# Patient Record
Sex: FEMALE | Race: WHITE | Employment: OTHER | URBAN - METROPOLITAN AREA
[De-identification: names, ages, dates, MRNs, and addresses within clinical notes are randomized per-mention and may not be internally consistent; named-entity substitution may affect disease eponyms.]

---

## 2017-06-26 RX ORDER — CARVEDILOL 25 MG/1
TABLET ORAL
Qty: 90 TABLET | Refills: 3 | Status: SHIPPED | OUTPATIENT
Start: 2017-06-26 | End: 2018-08-18 | Stop reason: SDUPTHER

## 2017-08-03 ENCOUNTER — OFFICE VISIT (OUTPATIENT)
Dept: FAMILY MEDICINE CLINIC | Age: 82
End: 2017-08-03
Payer: MEDICARE

## 2017-08-03 VITALS
RESPIRATION RATE: 16 BRPM | BODY MASS INDEX: 34.23 KG/M2 | HEIGHT: 66 IN | WEIGHT: 213 LBS | HEART RATE: 80 BPM | SYSTOLIC BLOOD PRESSURE: 112 MMHG | OXYGEN SATURATION: 97 % | DIASTOLIC BLOOD PRESSURE: 52 MMHG

## 2017-08-03 DIAGNOSIS — F41.9 ANXIETY: ICD-10-CM

## 2017-08-03 DIAGNOSIS — R73.01 IFG (IMPAIRED FASTING GLUCOSE): ICD-10-CM

## 2017-08-03 DIAGNOSIS — I10 ESSENTIAL HYPERTENSION: Primary | ICD-10-CM

## 2017-08-03 DIAGNOSIS — E66.9 OBESITY, UNSPECIFIED OBESITY SEVERITY, UNSPECIFIED OBESITY TYPE: ICD-10-CM

## 2017-08-03 DIAGNOSIS — M85.80 OSTEOPENIA, UNSPECIFIED LOCATION: ICD-10-CM

## 2017-08-03 DIAGNOSIS — F32.A DEPRESSION, UNSPECIFIED DEPRESSION TYPE: ICD-10-CM

## 2017-08-03 DIAGNOSIS — Z78.0 POST-MENOPAUSAL: ICD-10-CM

## 2017-08-03 DIAGNOSIS — Z23 NEED FOR PROPHYLACTIC VACCINATION AGAINST STREPTOCOCCUS PNEUMONIAE (PNEUMOCOCCUS): ICD-10-CM

## 2017-08-03 DIAGNOSIS — E03.9 ACQUIRED HYPOTHYROIDISM: ICD-10-CM

## 2017-08-03 PROCEDURE — G8400 PT W/DXA NO RESULTS DOC: HCPCS | Performed by: FAMILY MEDICINE

## 2017-08-03 PROCEDURE — 4040F PNEUMOC VAC/ADMIN/RCVD: CPT | Performed by: FAMILY MEDICINE

## 2017-08-03 PROCEDURE — 1123F ACP DISCUSS/DSCN MKR DOCD: CPT | Performed by: FAMILY MEDICINE

## 2017-08-03 PROCEDURE — 90732 PPSV23 VACC 2 YRS+ SUBQ/IM: CPT | Performed by: FAMILY MEDICINE

## 2017-08-03 PROCEDURE — G8427 DOCREV CUR MEDS BY ELIG CLIN: HCPCS | Performed by: FAMILY MEDICINE

## 2017-08-03 PROCEDURE — 1090F PRES/ABSN URINE INCON ASSESS: CPT | Performed by: FAMILY MEDICINE

## 2017-08-03 PROCEDURE — G8417 CALC BMI ABV UP PARAM F/U: HCPCS | Performed by: FAMILY MEDICINE

## 2017-08-03 PROCEDURE — 99214 OFFICE O/P EST MOD 30 MIN: CPT | Performed by: FAMILY MEDICINE

## 2017-08-03 PROCEDURE — 1036F TOBACCO NON-USER: CPT | Performed by: FAMILY MEDICINE

## 2017-08-03 PROCEDURE — G0009 ADMIN PNEUMOCOCCAL VACCINE: HCPCS | Performed by: FAMILY MEDICINE

## 2017-08-03 RX ORDER — TRIAMCINOLONE ACETONIDE 1 MG/G
CREAM TOPICAL DAILY
COMMUNITY
End: 2020-11-09

## 2017-08-03 RX ORDER — HYDROXYZINE HYDROCHLORIDE 10 MG/1
10 TABLET, FILM COATED ORAL
COMMUNITY
End: 2018-04-30 | Stop reason: ALTCHOICE

## 2017-08-03 RX ORDER — PAROXETINE HYDROCHLORIDE 40 MG/1
TABLET, FILM COATED ORAL
Qty: 90 TABLET | Refills: 3 | Status: SHIPPED | OUTPATIENT
Start: 2017-08-03 | End: 2018-04-30 | Stop reason: ALTCHOICE

## 2017-08-03 ASSESSMENT — PATIENT HEALTH QUESTIONNAIRE - PHQ9
1. LITTLE INTEREST OR PLEASURE IN DOING THINGS: 0
2. FEELING DOWN, DEPRESSED OR HOPELESS: 0
SUM OF ALL RESPONSES TO PHQ QUESTIONS 1-9: 0
SUM OF ALL RESPONSES TO PHQ9 QUESTIONS 1 & 2: 0

## 2017-08-03 ASSESSMENT — ENCOUNTER SYMPTOMS
RESPIRATORY NEGATIVE: 1
GASTROINTESTINAL NEGATIVE: 1

## 2017-08-08 ENCOUNTER — TELEPHONE (OUTPATIENT)
Dept: FAMILY MEDICINE CLINIC | Age: 82
End: 2017-08-08

## 2017-12-19 RX ORDER — LEVOTHYROXINE SODIUM 0.07 MG/1
75 TABLET ORAL DAILY
Qty: 90 TABLET | Refills: 3 | Status: SHIPPED | OUTPATIENT
Start: 2017-12-19 | End: 2020-01-08

## 2017-12-19 NOTE — TELEPHONE ENCOUNTER
Carissa Aranda called requesting a refill on the following medications:  Requested Prescriptions     Pending Prescriptions Disp Refills    levothyroxine (SYNTHROID) 75 MCG tablet 90 tablet 3     Sig: Take 1 tablet by mouth Daily     Pharmacy verified: [address]      Date of last visit: 8/3/17  Date of next visit (if applicable): Visit date not found

## 2018-04-25 ENCOUNTER — APPOINTMENT (OUTPATIENT)
Dept: GENERAL RADIOLOGY | Age: 83
End: 2018-04-25
Payer: MEDICARE

## 2018-04-25 ENCOUNTER — HOSPITAL ENCOUNTER (EMERGENCY)
Age: 83
Discharge: HOME | End: 2018-04-25
Attending: FAMILY MEDICINE
Payer: MEDICARE

## 2018-04-25 ENCOUNTER — APPOINTMENT (OUTPATIENT)
Dept: CT IMAGING | Age: 83
End: 2018-04-25
Payer: MEDICARE

## 2018-04-25 VITALS
RESPIRATION RATE: 18 BRPM | OXYGEN SATURATION: 98 % | DIASTOLIC BLOOD PRESSURE: 98 MMHG | BODY MASS INDEX: 35.49 KG/M2 | HEART RATE: 84 BPM | TEMPERATURE: 98.2 F | WEIGHT: 213 LBS | SYSTOLIC BLOOD PRESSURE: 138 MMHG | HEIGHT: 65 IN

## 2018-04-25 DIAGNOSIS — M77.8 RIGHT SHOULDER TENDINITIS: ICD-10-CM

## 2018-04-25 DIAGNOSIS — S00.03XA CONTUSION OF SCALP, INITIAL ENCOUNTER: ICD-10-CM

## 2018-04-25 DIAGNOSIS — S09.90XA INJURY OF HEAD, INITIAL ENCOUNTER: Primary | ICD-10-CM

## 2018-04-25 LAB
ALBUMIN SERPL-MCNC: 3.9 G/DL (ref 3.5–5.1)
ALP BLD-CCNC: 109 U/L (ref 38–126)
ALT SERPL-CCNC: 6 U/L (ref 11–66)
ANION GAP SERPL CALCULATED.3IONS-SCNC: 11 MEQ/L (ref 8–16)
AST SERPL-CCNC: 12 U/L (ref 5–40)
BASOPHILS # BLD: 0.3 %
BASOPHILS ABSOLUTE: 0 THOU/MM3 (ref 0–0.1)
BILIRUB SERPL-MCNC: 0.6 MG/DL (ref 0.3–1.2)
BUN BLDV-MCNC: 14 MG/DL (ref 7–22)
CALCIUM SERPL-MCNC: 9.6 MG/DL (ref 8.5–10.5)
CHLORIDE BLD-SCNC: 97 MEQ/L (ref 98–111)
CO2: 30 MEQ/L (ref 23–33)
CREAT SERPL-MCNC: 0.8 MG/DL (ref 0.4–1.2)
EKG ATRIAL RATE: 73 BPM
EKG P AXIS: 59 DEGREES
EKG P-R INTERVAL: 162 MS
EKG Q-T INTERVAL: 408 MS
EKG QRS DURATION: 90 MS
EKG QTC CALCULATION (BAZETT): 449 MS
EKG T AXIS: 49 DEGREES
EKG VENTRICULAR RATE: 73 BPM
EOSINOPHIL # BLD: 0.4 %
EOSINOPHILS ABSOLUTE: 0 THOU/MM3 (ref 0–0.4)
GFR SERPL CREATININE-BSD FRML MDRD: 69 ML/MIN/1.73M2
GLUCOSE BLD-MCNC: 100 MG/DL (ref 70–108)
HCT VFR BLD CALC: 39.4 % (ref 37–47)
HEMOGLOBIN: 13.1 GM/DL (ref 12–16)
LYMPHOCYTES # BLD: 15 %
LYMPHOCYTES ABSOLUTE: 1.3 THOU/MM3 (ref 1–4.8)
MCH RBC QN AUTO: 30.1 PG (ref 27–31)
MCHC RBC AUTO-ENTMCNC: 33.3 GM/DL (ref 33–37)
MCV RBC AUTO: 90.2 FL (ref 81–99)
MONOCYTES # BLD: 8.6 %
MONOCYTES ABSOLUTE: 0.7 THOU/MM3 (ref 0.4–1.3)
NUCLEATED RED BLOOD CELLS: 0 /100 WBC
OSMOLALITY CALCULATION: 276.2 MOSMOL/KG (ref 275–300)
PDW BLD-RTO: 13.8 % (ref 11.5–14.5)
PLATELET # BLD: 231 THOU/MM3 (ref 130–400)
PMV BLD AUTO: 9.5 FL (ref 7.4–10.4)
POTASSIUM SERPL-SCNC: 4.2 MEQ/L (ref 3.5–5.2)
PRO-BNP: 1219 PG/ML (ref 0–1800)
RBC # BLD: 4.37 MILL/MM3 (ref 4.2–5.4)
SEG NEUTROPHILS: 75.7 %
SEGMENTED NEUTROPHILS ABSOLUTE COUNT: 6.6 THOU/MM3 (ref 1.8–7.7)
SODIUM BLD-SCNC: 138 MEQ/L (ref 135–145)
TOTAL PROTEIN: 7.7 G/DL (ref 6.1–8)
TROPONIN T: < 0.01 NG/ML
WBC # BLD: 8.7 THOU/MM3 (ref 4.8–10.8)

## 2018-04-25 PROCEDURE — 72125 CT NECK SPINE W/O DYE: CPT

## 2018-04-25 PROCEDURE — 85025 COMPLETE CBC W/AUTO DIFF WBC: CPT

## 2018-04-25 PROCEDURE — 73030 X-RAY EXAM OF SHOULDER: CPT

## 2018-04-25 PROCEDURE — 70450 CT HEAD/BRAIN W/O DYE: CPT

## 2018-04-25 PROCEDURE — 80053 COMPREHEN METABOLIC PANEL: CPT

## 2018-04-25 PROCEDURE — 93005 ELECTROCARDIOGRAM TRACING: CPT | Performed by: FAMILY MEDICINE

## 2018-04-25 PROCEDURE — 84484 ASSAY OF TROPONIN QUANT: CPT

## 2018-04-25 PROCEDURE — 71045 X-RAY EXAM CHEST 1 VIEW: CPT

## 2018-04-25 PROCEDURE — 99285 EMERGENCY DEPT VISIT HI MDM: CPT

## 2018-04-25 PROCEDURE — 83880 ASSAY OF NATRIURETIC PEPTIDE: CPT

## 2018-04-25 PROCEDURE — 36415 COLL VENOUS BLD VENIPUNCTURE: CPT

## 2018-04-25 RX ORDER — ACETAMINOPHEN 325 MG/1
650 TABLET ORAL ONCE
Status: DISCONTINUED | OUTPATIENT
Start: 2018-04-25 | End: 2018-04-25 | Stop reason: HOSPADM

## 2018-04-25 ASSESSMENT — PAIN DESCRIPTION - PAIN TYPE
TYPE: ACUTE PAIN

## 2018-04-25 ASSESSMENT — ENCOUNTER SYMPTOMS
EYE PAIN: 0
DIARRHEA: 0
RHINORRHEA: 0
WHEEZING: 0
EYE DISCHARGE: 0
ABDOMINAL PAIN: 0
VOMITING: 0
SHORTNESS OF BREATH: 0
NAUSEA: 0
SORE THROAT: 0
COUGH: 0
BACK PAIN: 0

## 2018-04-25 ASSESSMENT — PAIN DESCRIPTION - ORIENTATION
ORIENTATION: RIGHT
ORIENTATION: RIGHT
ORIENTATION: RIGHT;LOWER

## 2018-04-25 ASSESSMENT — PAIN DESCRIPTION - PROGRESSION: CLINICAL_PROGRESSION: GRADUALLY IMPROVING

## 2018-04-25 ASSESSMENT — PAIN SCALES - GENERAL
PAINLEVEL_OUTOF10: 5
PAINLEVEL_OUTOF10: 5
PAINLEVEL_OUTOF10: 3
PAINLEVEL_OUTOF10: 5

## 2018-04-25 ASSESSMENT — PAIN DESCRIPTION - LOCATION
LOCATION: SHOULDER
LOCATION: SHOULDER

## 2018-04-30 ENCOUNTER — OFFICE VISIT (OUTPATIENT)
Dept: FAMILY MEDICINE CLINIC | Age: 83
End: 2018-04-30
Payer: MEDICARE

## 2018-04-30 VITALS
WEIGHT: 199 LBS | OXYGEN SATURATION: 93 % | HEART RATE: 100 BPM | RESPIRATION RATE: 25 BRPM | HEIGHT: 66 IN | SYSTOLIC BLOOD PRESSURE: 120 MMHG | BODY MASS INDEX: 31.98 KG/M2 | DIASTOLIC BLOOD PRESSURE: 82 MMHG

## 2018-04-30 DIAGNOSIS — M25.511 ACUTE PAIN OF RIGHT SHOULDER: ICD-10-CM

## 2018-04-30 DIAGNOSIS — E03.9 HYPOTHYROIDISM, UNSPECIFIED TYPE: ICD-10-CM

## 2018-04-30 DIAGNOSIS — I10 ESSENTIAL HYPERTENSION: ICD-10-CM

## 2018-04-30 DIAGNOSIS — S09.90XA INJURY OF HEAD, INITIAL ENCOUNTER: Primary | ICD-10-CM

## 2018-04-30 DIAGNOSIS — F32.A DEPRESSION, UNSPECIFIED DEPRESSION TYPE: ICD-10-CM

## 2018-04-30 DIAGNOSIS — S00.03XA CONTUSION OF SCALP, INITIAL ENCOUNTER: ICD-10-CM

## 2018-04-30 PROCEDURE — G8427 DOCREV CUR MEDS BY ELIG CLIN: HCPCS | Performed by: FAMILY MEDICINE

## 2018-04-30 PROCEDURE — 4040F PNEUMOC VAC/ADMIN/RCVD: CPT | Performed by: FAMILY MEDICINE

## 2018-04-30 PROCEDURE — G8417 CALC BMI ABV UP PARAM F/U: HCPCS | Performed by: FAMILY MEDICINE

## 2018-04-30 PROCEDURE — G8400 PT W/DXA NO RESULTS DOC: HCPCS | Performed by: FAMILY MEDICINE

## 2018-04-30 PROCEDURE — 1123F ACP DISCUSS/DSCN MKR DOCD: CPT | Performed by: FAMILY MEDICINE

## 2018-04-30 PROCEDURE — 99214 OFFICE O/P EST MOD 30 MIN: CPT | Performed by: FAMILY MEDICINE

## 2018-04-30 PROCEDURE — 1090F PRES/ABSN URINE INCON ASSESS: CPT | Performed by: FAMILY MEDICINE

## 2018-04-30 PROCEDURE — 1036F TOBACCO NON-USER: CPT | Performed by: FAMILY MEDICINE

## 2018-04-30 RX ORDER — DULOXETIN HYDROCHLORIDE 60 MG/1
60 CAPSULE, DELAYED RELEASE ORAL DAILY
COMMUNITY
End: 2020-01-07

## 2018-04-30 RX ORDER — ALPRAZOLAM 0.25 MG/1
0.25 TABLET ORAL NIGHTLY PRN
COMMUNITY
End: 2020-11-09

## 2018-04-30 ASSESSMENT — ENCOUNTER SYMPTOMS
NAUSEA: 0
GASTROINTESTINAL NEGATIVE: 1
VOMITING: 0
RESPIRATORY NEGATIVE: 1

## 2018-05-09 ENCOUNTER — TELEPHONE (OUTPATIENT)
Dept: FAMILY MEDICINE CLINIC | Age: 83
End: 2018-05-09

## 2018-05-09 RX ORDER — IBUPROFEN 600 MG/1
600 TABLET ORAL EVERY 8 HOURS PRN
Qty: 30 TABLET | Refills: 1 | Status: SHIPPED | OUTPATIENT
Start: 2018-05-09 | End: 2020-01-08

## 2018-08-20 RX ORDER — CARVEDILOL 25 MG/1
TABLET ORAL
Qty: 90 TABLET | Refills: 0 | Status: SHIPPED | OUTPATIENT
Start: 2018-08-20 | End: 2020-11-09

## 2020-01-07 ENCOUNTER — OFFICE VISIT (OUTPATIENT)
Dept: FAMILY MEDICINE CLINIC | Age: 85
End: 2020-01-07
Payer: MEDICARE

## 2020-01-07 VITALS
SYSTOLIC BLOOD PRESSURE: 122 MMHG | RESPIRATION RATE: 15 BRPM | HEART RATE: 65 BPM | TEMPERATURE: 97.7 F | BODY MASS INDEX: 29.06 KG/M2 | WEIGHT: 180.8 LBS | HEIGHT: 66 IN | DIASTOLIC BLOOD PRESSURE: 72 MMHG | OXYGEN SATURATION: 96 %

## 2020-01-07 PROCEDURE — 99214 OFFICE O/P EST MOD 30 MIN: CPT | Performed by: FAMILY MEDICINE

## 2020-01-07 RX ORDER — SERTRALINE HYDROCHLORIDE 100 MG/1
TABLET, FILM COATED ORAL
COMMUNITY
Start: 2019-12-16 | End: 2020-11-09

## 2020-01-07 SDOH — ECONOMIC STABILITY: INCOME INSECURITY: HOW HARD IS IT FOR YOU TO PAY FOR THE VERY BASICS LIKE FOOD, HOUSING, MEDICAL CARE, AND HEATING?: NOT HARD AT ALL

## 2020-01-07 SDOH — ECONOMIC STABILITY: FOOD INSECURITY: WITHIN THE PAST 12 MONTHS, YOU WORRIED THAT YOUR FOOD WOULD RUN OUT BEFORE YOU GOT MONEY TO BUY MORE.: NEVER TRUE

## 2020-01-07 SDOH — ECONOMIC STABILITY: TRANSPORTATION INSECURITY
IN THE PAST 12 MONTHS, HAS THE LACK OF TRANSPORTATION KEPT YOU FROM MEDICAL APPOINTMENTS OR FROM GETTING MEDICATIONS?: NO

## 2020-01-07 SDOH — ECONOMIC STABILITY: FOOD INSECURITY: WITHIN THE PAST 12 MONTHS, THE FOOD YOU BOUGHT JUST DIDN'T LAST AND YOU DIDN'T HAVE MONEY TO GET MORE.: NEVER TRUE

## 2020-01-07 SDOH — ECONOMIC STABILITY: TRANSPORTATION INSECURITY
IN THE PAST 12 MONTHS, HAS LACK OF TRANSPORTATION KEPT YOU FROM MEETINGS, WORK, OR FROM GETTING THINGS NEEDED FOR DAILY LIVING?: NO

## 2020-01-07 ASSESSMENT — ENCOUNTER SYMPTOMS
SHORTNESS OF BREATH: 0
GASTROINTESTINAL NEGATIVE: 1
COUGH: 1
WHEEZING: 0
CHEST TIGHTNESS: 0
SINUS PRESSURE: 1

## 2020-01-07 NOTE — PROGRESS NOTES
Subjective:      Patient ID: Tiffany Warren is a 81 y.o. female. HPI:    Chief Complaint   Patient presents with    Nasal Congestion     present for 3 days not taking OTC medication for current symptoms     Cough    Chest Congestion     Pt here to re-establish. Was going to [facility]. Pt here for sinus and chest congestion for the last few days. Denies fevers. Denies wheezing or chest tightness. She is taking OTC cough and cold medication with little relief. BP looks great. BP Readings from Last 3 Encounters:   01/07/20 122/72   04/30/18 120/82   04/25/18 (!) 138/98     Down 20 lbs since last time in this office. She is trying to lose weight. Wt Readings from Last 3 Encounters:   01/07/20 180 lb 12.8 oz (82 kg)   04/30/18 199 lb (90.3 kg)   04/25/18 213 lb (96.6 kg)     Hx of anxiety, well controlled on the Zoloft and daily Xanax. No recent labs. Patient Active Problem List   Diagnosis    Hypothyroid    HTN (hypertension)    Depression    Post-menopausal     Past Surgical History:   Procedure Laterality Date    CHOLECYSTECTOMY      VASCULAR SURGERY       Prior to Admission medications    Medication Sig Start Date End Date Taking? Authorizing Provider   sertraline (ZOLOFT) 100 MG tablet TK 1 AND 1/2 TS PO QD 12/16/19  Yes Historical Provider, MD   carvedilol (COREG) 25 MG tablet TAKE 1/2 TABLET BY MOUTH TWICE DAILY 8/20/18  Yes Evie Yanez DO   ibuprofen (IBU) 600 MG tablet Take 1 tablet by mouth every 8 hours as needed for Pain Take with food. 5/9/18  Yes Evie Yanez DO   ALPRAZolam [place]) 0.25 MG tablet Take 0.25 mg by mouth nightly as needed for Sleep. Kathy Tom Historical Provider, MD   levothyroxine (SYNTHROID) 75 MCG tablet Take 1 tablet by mouth Daily 12/19/17  Yes Evie Yanez DO   triamcinolone (KENALOG) 0.1 % cream Apply topically daily Apply topically 2 times daily.    Yes Historical Provider, MD   naproxen sodium (ALEVE) 220 MG tablet Take 220 mg by mouth 2 times daily (with meals)   Yes Historical Provider, MD   aspirin 81 MG EC tablet Take 81 mg by mouth daily.      Yes Historical Provider, MD       Lab Results   Component Value Date    LABA1C 6.1 (H) 10/08/2015     No results found for: EAG    No components found for: CHLPL  Lab Results   Component Value Date    TRIG 98 10/08/2015    TRIG 61 07/31/2013    TRIG 57 06/25/2012     Lab Results   Component Value Date    HDL 76 (H) 10/08/2015    HDL 68 07/31/2013    HDL 76 06/25/2012     Lab Results   Component Value Date    LDLCALC 90 10/08/2015    LDLCALC 94 07/31/2013    LDLCALC 89 06/25/2012     Lab Results   Component Value Date    LABVLDL 20 10/08/2015         Chemistry        Component Value Date/Time     04/25/2018 1859    K 4.2 04/25/2018 1859    CL 97 (L) 04/25/2018 1859    CO2 30 04/25/2018 1859    BUN 14 04/25/2018 1859    CREATININE 0.8 04/25/2018 1859        Component Value Date/Time    CALCIUM 9.6 04/25/2018 1859    ALKPHOS 109 04/25/2018 1859    AST 12 04/25/2018 1859    ALT 6 (L) 04/25/2018 1859    BILITOT 0.6 04/25/2018 1859            Lab Results   Component Value Date    TSH 2.350 01/12/2016       Lab Results   Component Value Date    WBC 8.7 04/25/2018    HGB 13.1 04/25/2018    HCT 39.4 04/25/2018    MCV 90.2 04/25/2018     04/25/2018         Health Maintenance   Topic Date Due    Shingles Vaccine (1 of 2) 02/24/1986    TSH testing  01/12/2017    Flu vaccine (1) 09/01/2019    DTaP/Tdap/Td vaccine (2 - Td) 01/03/2026    DEXA (modify frequency per FRAX score)  Completed    Pneumococcal 65+ years Vaccine  Completed       Immunization History   Administered Date(s) Administered    Influenza 10/03/2012    Influenza Vaccine, unspecified formulation 09/10/2015    Influenza Virus Vaccine 08/20/2014, 10/13/2016    Pneumococcal Conjugate 13-valent ([id number]) 09/10/2015    Pneumococcal Polysaccharide ([id number]) 10/13/2016, 08/03/2017    Tdap (Boostrix,

## 2020-01-08 LAB
ABSOLUTE BASO #: 0.1 X10E9/L (ref 0–0.9)
ABSOLUTE EOS #: 0.2 X10E9/L (ref 0–0.4)
ABSOLUTE LYMPH #: 2.3 X10E9/L (ref 1–3.5)
ABSOLUTE MONO #: 0.7 X10E9/L (ref 0–0.9)
ABSOLUTE NEUT #: 5.6 X10E9/L (ref 1.5–6.6)
ALBUMIN SERPL-MCNC: 4.3 G/DL (ref 3.2–5.3)
ALK PHOSPHATASE: 81 U/L (ref 39–130)
ALT SERPL-CCNC: 8 U/L (ref 0–31)
ANION GAP SERPL CALCULATED.3IONS-SCNC: 15 MMOL/L (ref 4–12)
AST SERPL-CCNC: 15 U/L (ref 0–41)
AVERAGE GLUCOSE: 114 MG/DL
BASOPHILS RELATIVE PERCENT: 1.3 %
BILIRUB SERPL-MCNC: 0.4 MG/DL (ref 0.3–1.2)
BUN BLDV-MCNC: 30 MG/DL (ref 5–27)
CALCIUM SERPL-MCNC: 9.5 MG/DL (ref 8.5–10.5)
CHLORIDE BLD-SCNC: 102 MMOL/L (ref 98–109)
CHOLESTEROL/HDL RATIO: 2.6 (ref 1–5)
CHOLESTEROL: 198 MG/DL (ref 150–200)
CO2: 25 MMOL/L (ref 22–32)
CREAT SERPL-MCNC: 1.22 MG/DL (ref 0.4–1)
EGFR AFRICAN AMERICAN: 51 ML/MIN/1.73SQ.M
EGFR IF NONAFRICAN AMERICAN: 42 ML/MIN/1.73SQ.M
EOSINOPHILS RELATIVE PERCENT: 2.3 %
GLUCOSE: 75 MG/DL (ref 65–99)
HBA1C MFR BLD: 5.6 % (ref 4.4–6.4)
HCT VFR BLD CALC: 41 % (ref 34–48)
HDLC SERPL-MCNC: 75 MG/DL
HEMOGLOBIN: 13.9 G/DL (ref 11.7–16.1)
LDL CHOLESTEROL CALCULATED: 107 MG/DL
LDL/HDL RATIO: 1.4
LYMPHOCYTE %: 25.8 %
MCH RBC QN AUTO: 30.6 PG (ref 27–35)
MCHC RBC AUTO-ENTMCNC: 33.8 G/DL (ref 31–36)
MCV RBC AUTO: 91 FL (ref 81–101)
MONOCYTES # BLD: 7.9 %
NEUTROPHILS RELATIVE PERCENT: 62.7 %
PDW BLD-RTO: 14.5 % (ref 11.5–14.7)
PLATELETS: 212 X10E9/L (ref 150–450)
PMV BLD AUTO: 9.6 FL (ref 7–12)
POTASSIUM SERPL-SCNC: 4.3 MMOL/L (ref 3.5–5)
RBC: 4.54 X10E12/L (ref 3.3–5.5)
SODIUM BLD-SCNC: 142 MMOL/L (ref 134–146)
T4 FREE: 0.86 NG/DL (ref 0.61–1.6)
TOTAL PROTEIN: 7.7 G/DL (ref 6–8)
TRIGL SERPL-MCNC: 81 MG/DL (ref 27–150)
TSH SERPL DL<=0.05 MIU/L-ACNC: 5.33 UIU/ML (ref 0.49–4.67)
VLDLC SERPL CALC-MCNC: 16 MG/DL (ref 0–30)
WBC: 8.9 X10E9/L (ref 4–11.8)

## 2020-01-08 RX ORDER — LEVOTHYROXINE SODIUM 88 UG/1
88 TABLET ORAL DAILY
Qty: 90 TABLET | Refills: 0 | Status: SHIPPED | OUTPATIENT
Start: 2020-01-08 | End: 2020-11-09

## 2020-10-28 ENCOUNTER — NURSE ONLY (OUTPATIENT)
Dept: FAMILY MEDICINE CLINIC | Age: 85
End: 2020-10-28
Payer: MEDICARE

## 2020-10-28 VITALS — TEMPERATURE: 97 F

## 2020-10-28 PROCEDURE — G0008 ADMIN INFLUENZA VIRUS VAC: HCPCS | Performed by: FAMILY MEDICINE

## 2020-10-28 PROCEDURE — 90694 VACC AIIV4 NO PRSRV 0.5ML IM: CPT | Performed by: FAMILY MEDICINE

## 2020-10-28 NOTE — PROGRESS NOTES
After obtaining consent, and per orders of Dr. Misa García, injection of Fluad 0.5 ML given IM in Left deltoid by Lakeshia Araiza CMA . Patient instructed to remain in clinic for 20 minutes afterwards, and to report any adverse reaction to me immediately.     Immunizations Administered     Name Date Dose Route    Influenza, Quadv, adjuvanted, 65 yrs +, IM, PF (Fluad) 10/28/2020 0.5 mL Intramuscular    Site: Deltoid- Left    Lot: [id number]    NDC: 97049-230-78

## 2020-11-09 ENCOUNTER — OFFICE VISIT (OUTPATIENT)
Dept: FAMILY MEDICINE CLINIC | Age: 85
End: 2020-11-09
Payer: MEDICARE

## 2020-11-09 VITALS
DIASTOLIC BLOOD PRESSURE: 80 MMHG | BODY MASS INDEX: 29.72 KG/M2 | RESPIRATION RATE: 12 BRPM | HEIGHT: 66 IN | HEART RATE: 60 BPM | TEMPERATURE: 97 F | WEIGHT: 184.9 LBS | SYSTOLIC BLOOD PRESSURE: 136 MMHG

## 2020-11-09 PROCEDURE — 1123F ACP DISCUSS/DSCN MKR DOCD: CPT | Performed by: FAMILY MEDICINE

## 2020-11-09 PROCEDURE — G0438 PPPS, INITIAL VISIT: HCPCS | Performed by: FAMILY MEDICINE

## 2020-11-09 PROCEDURE — 4040F PNEUMOC VAC/ADMIN/RCVD: CPT | Performed by: FAMILY MEDICINE

## 2020-11-09 PROCEDURE — G8484 FLU IMMUNIZE NO ADMIN: HCPCS | Performed by: FAMILY MEDICINE

## 2020-11-09 ASSESSMENT — LIFESTYLE VARIABLES: HOW OFTEN DO YOU HAVE A DRINK CONTAINING ALCOHOL: 0

## 2020-11-09 ASSESSMENT — PATIENT HEALTH QUESTIONNAIRE - PHQ9
SUM OF ALL RESPONSES TO PHQ QUESTIONS 1-9: 0
2. FEELING DOWN, DEPRESSED OR HOPELESS: 0
SUM OF ALL RESPONSES TO PHQ QUESTIONS 1-9: 0
1. LITTLE INTEREST OR PLEASURE IN DOING THINGS: 0
SUM OF ALL RESPONSES TO PHQ QUESTIONS 1-9: 0
SUM OF ALL RESPONSES TO PHQ9 QUESTIONS 1 & 2: 0

## 2020-11-09 ASSESSMENT — ENCOUNTER SYMPTOMS
GASTROINTESTINAL NEGATIVE: 1
RESPIRATORY NEGATIVE: 1

## 2020-11-09 NOTE — PATIENT INSTRUCTIONS
You may receive a survey about your visit with us today. The feedback from our patients helps us identify what is working well and where the service to all patients can be enhanced. Thank you! Patient Education        High Blood Pressure: Care Instructions  Overview     It's normal for blood pressure to go up and down throughout the day. But if it stays up, you have high blood pressure. Another name for high blood pressure is hypertension. Despite what a lot of people think, high blood pressure usually doesn't cause headaches or make you feel dizzy or lightheaded. It usually has no symptoms. But it does increase your risk of stroke, heart attack, and other problems. You and your doctor will talk about your risks of these problems based on your blood pressure. Your doctor will give you a goal for your blood pressure. Your goal will be based on your health and your age. Lifestyle changes, such as eating healthy and being active, are always important to help lower blood pressure. You might also take medicine to reach your blood pressure goal.  Follow-up care is a key part of your treatment and safety. Be sure to make and go to all appointments, and call your doctor if you are having problems. It's also a good idea to know your test results and keep a list of the medicines you take. How can you care for yourself at home? Medical treatment  · If you stop taking your medicine, your blood pressure will go back up. You may take one or more types of medicine to lower your blood pressure. Be safe with medicines. Take your medicine exactly as prescribed. Call your doctor if you think you are having a problem with your medicine. · Talk to your doctor before you start taking aspirin every day. Aspirin can help certain people lower their risk of a heart attack or stroke. But taking aspirin isn't right for everyone, because it can cause serious bleeding. · See your doctor regularly.  You may need to see the doctor more often at first or until your blood pressure comes down. · If you are taking blood pressure medicine, talk to your doctor before you take decongestants or anti-inflammatory medicine, such as ibuprofen. Some of these medicines can raise blood pressure. · Learn how to check your blood pressure at home. Lifestyle changes  · Stay at a healthy weight. This is especially important if you put on weight around the waist. Losing even 10 pounds can help you lower your blood pressure. · If your doctor recommends it, get more exercise. Walking is a good choice. Bit by bit, increase the amount you walk every day. Try for at least 30 minutes on most days of the week. You also may want to swim, bike, or do other activities. · Avoid or limit alcohol. Talk to your doctor about whether you can drink any alcohol. · Try to limit how much sodium you eat to less than 2,300 milligrams (mg) a day. Your doctor may ask you to try to eat less than 1,500 mg a day. · Eat plenty of fruits (such as bananas and oranges), vegetables, legumes, whole grains, and low-fat dairy products. · Lower the amount of saturated fat in your diet. Saturated fat is found in animal products such as milk, cheese, and meat. Limiting these foods may help you lose weight and also lower your risk for heart disease. · Do not smoke. Smoking increases your risk for heart attack and stroke. If you need help quitting, talk to your doctor about stop-smoking programs and medicines. These can increase your chances of quitting for good. When should you call for help? Call  911 anytime you think you may need emergency care. This may mean having symptoms that suggest that your blood pressure is causing a serious heart or blood vessel problem. Your blood pressure may be over 180/120. For example, call 911 if:    · You have symptoms of a heart attack. These may include:  ? Chest pain or pressure, or a strange feeling in the chest.  ? Sweating. ?  [employer] disclaims any warranty or liability for your use of this information. Personalized Preventive Plan for Maurice Miller - 11/9/2020  Medicare offers a range of preventive health benefits. Some of the tests and screenings are paid in full while other may be subject to a deductible, co-insurance, and/or copay. Some of these benefits include a comprehensive review of your medical history including lifestyle, illnesses that may run in your family, and various assessments and screenings as appropriate. After reviewing your medical record and screening and assessments performed today your provider may have ordered immunizations, labs, imaging, and/or referrals for you. A list of these orders (if applicable) as well as your Preventive Care list are included within your After Visit Summary for your review. Other Preventive Recommendations:    · A preventive eye exam performed by an eye specialist is recommended every 1-2 years to screen for glaucoma; cataracts, macular degeneration, and other eye disorders. · A preventive dental visit is recommended every 6 months. · Try to get at least 150 minutes of exercise per week or 10,000 steps per day on a pedometer . · Order or download the FREE \"Exercise & Physical Activity: Your Everyday Guide\" from [employer]. Call 5-899.372.6698 or search [employer] online. · You need 4047-0929 mg of calcium and 5697-7341 IU of vitamin D per day. It is possible to meet your calcium requirement with diet alone, but a vitamin D supplement is usually necessary to meet this goal.  · When exposed to the sun, use a sunscreen that protects against both UVA and UVB radiation with an SPF of 30 or greater. Reapply every 2 to 3 hours or after sweating, drying off with a towel, or swimming. · Always wear a seat belt when traveling in a car. Always wear a helmet when riding a bicycle or motorcycle.

## 2020-11-09 NOTE — PROGRESS NOTES
Chronic Disease Visit Information    BP Readings from Last 3 Encounters:   11/09/20 136/80   01/07/20 122/72   04/30/18 120/82          Hemoglobin A1C (%)   Date Value   01/07/2020 5.6   10/08/2015 6.1 (H)     LDL Calculated (mg/dL)   Date Value   01/07/2020 107     HDL (mg/dL)   Date Value   01/07/2020 75     BUN (mg/dL)   Date Value   01/07/2020 30 (H)     CREATININE (mg/dL)   Date Value   01/07/2020 1.22 (H)     Glucose (mg/dL)   Date Value   01/07/2020 75            Have you changed or started any medications since your last visit including any over-the-counter medicines, vitamins, or herbal medicines? no   Are you having any side effects from any of your medications? -  no  Have you stopped taking any of your medications? Is so, why? -  yes - stopped all meds    Have you seen any other physician or provider since your last visit? No  Have you had any other diagnostic tests since your last visit? No  Have you been seen in the emergency room and/or had an admission to a hospital since we last saw you? No  Have you had your annual diabetic retinal (eye) exam? No  Have you had your routine dental cleaning in the past 6 months? no    Have you activated your [employer] account? If not, what are your barriers?  Yes     Patient Care Team:  Asif Ghotra DO as PCP - General (Family Medicine)  Asif Ghotra DO as PCP - [facility] Provider         Medical History Review  Past Medical, Family, and Social History reviewed and does contribute to the patient presenting condition    Health Maintenance   Topic Date Due    Shingles Vaccine (1 of 2) 02/24/1986    Annual Wellness Visit (AWV)  01/07/2020    TSH testing  01/07/2021    DTaP/Tdap/Td vaccine (2 - Td) 01/03/2026    DEXA (modify frequency per FRAX score)  Completed    Flu vaccine  Completed    Pneumococcal 65+ years Vaccine  Completed    Hepatitis A vaccine  Aged Out    Hepatitis B vaccine  Aged Out    Hib vaccine  Aged C/ Wilber Charo 19 Meningococcal (ACWY) vaccine  Aged Out

## 2020-11-09 NOTE — PROGRESS NOTES
Subjective:      Patient ID: Bebeto Savage is a 81 y.o. female. HPI:    Chief Complaint   Patient presents with    Medicare AW    Hypertension     AMW. BP well controlled. BP Readings from Last 3 Encounters:   11/09/20 136/80   01/07/20 122/72   04/30/18 120/82     Weight stable. Wt Readings from Last 3 Encounters:   11/09/20 184 lb 14.4 oz (83.9 kg)   01/07/20 180 lb 12.8 oz (82 kg)   04/30/18 199 lb (90.3 kg)     Pt stopped all of her medications a few months ago, just didn't want to take them anymore. No concerns for me today.     Patient Active Problem List   Diagnosis    Hypothyroid    HTN (hypertension)    Depression    Post-menopausal     Past Surgical History:   Procedure Laterality Date    CHOLECYSTECTOMY      VASCULAR SURGERY       Prior to Admission medications    Not on File       Lab Results   Component Value Date    LABA1C 5.6 01/07/2020     No results found for: EAG    No components found for: CHLPL  Lab Results   Component Value Date    TRIG 81 01/07/2020    TRIG 98 10/08/2015    TRIG 61 07/31/2013     Lab Results   Component Value Date    HDL 75 01/07/2020    HDL 76 (H) 10/08/2015    HDL 68 07/31/2013     Lab Results   Component Value Date    LDLCALC 107 01/07/2020    LDLCALC 90 10/08/2015    LDLCALC 94 07/31/2013     Lab Results   Component Value Date    LABVLDL 16 01/07/2020    LABVLDL 20 10/08/2015         Chemistry        Component Value Date/Time     01/07/2020 1025    K 4.3 01/07/2020 1025     01/07/2020 1025    CO2 25 01/07/2020 1025    BUN 30 (H) 01/07/2020 1025    CREATININE 1.22 (H) 01/07/2020 1025        Component Value Date/Time    CALCIUM 9.5 01/07/2020 1025    ALKPHOS 81 01/07/2020 1025    ALKPHOS 109 04/25/2018 1859    AST 15 01/07/2020 1025    ALT 8 01/07/2020 1025    BILITOT 0.4 01/07/2020 1025            Lab Results   Component Value Date    TSH 5.33 (H) 01/07/2020       Lab Results   Component Value Date    WBC 8.9 01/07/2020    HGB 13.9 01/07/2020    HCT 41.0 01/07/2020    MCV 91 01/07/2020     01/07/2020         Health Maintenance   Topic Date Due    Shingles Vaccine (1 of 2) 02/24/1986    Annual Wellness Visit (AWV)  01/07/2020    TSH testing  01/07/2021    DTaP/Tdap/Td vaccine (2 - Td) 01/03/2026    DEXA (modify frequency per FRAX score)  Completed    Flu vaccine  Completed    Pneumococcal 65+ years Vaccine  Completed    Hepatitis A vaccine  Aged Out    Hepatitis B vaccine  Aged Out    Hib vaccine  Aged Out    Meningococcal (ACWY) vaccine  Aged [employer] History   Administered Date(s) Administered    Influenza 10/03/2012    Influenza Vaccine, unspecified formulation 09/10/2015    Influenza Virus Vaccine 08/20/2014, 10/13/2016    Influenza, Quadv, adjuvanted, 65 yrs +, IM, PF (Fluad) 10/28/2020    Pneumococcal Conjugate 13-valent ([id number]) 09/10/2013, 09/10/2015    Pneumococcal Polysaccharide ([id number]) 10/13/2016, 08/03/2017    Tdap (Boostrix, Adacel) 01/03/2016         Review of Systems   Constitutional: Negative. HENT: Negative. Respiratory: Negative. Cardiovascular: Negative. Gastrointestinal: Negative. Musculoskeletal: Negative. All other systems reviewed and are negative. Objective:   Physical Exam  Vitals signs and nursing note reviewed. Constitutional:       General: She is not in acute distress. Appearance: Normal appearance. She is well-developed. HENT:      Head: Normocephalic and atraumatic. Right Ear: Tympanic membrane normal.      Left Ear: Tympanic membrane normal.   Eyes:      Conjunctiva/sclera: Conjunctivae normal.   Neck:      Musculoskeletal: Neck supple. Cardiovascular:      Rate and Rhythm: Normal rate and regular rhythm. Heart sounds: Normal heart sounds. No murmur. Pulmonary:      Effort: Pulmonary effort is normal.      Breath sounds: Normal breath sounds. No wheezing, rhonchi or rales.    Abdominal:      General: There is no distension. Skin:     General: Skin is warm and dry. Findings: No rash (on exposed surfaces). Neurological:      General: No focal deficit present. Mental Status: She is alert. Psychiatric:         Attention and Perception: Attention normal.         Mood and Affect: Mood normal.         Speech: Speech normal.         Behavior: Behavior normal. Behavior is cooperative. Thought Content: Thought content normal.         Judgment: Judgment normal.         Assessment:       Diagnosis Orders   1. Routine general medical examination at a health care facility     2. Essential hypertension     3. Hypothyroidism, unspecified type  TSH with Reflex   4. IFG (impaired fasting glucose)     5. GIULIANA (acute kidney injury) ([facility] [address]  Basic Metabolic Panel   6. Anxiety     7. Post-menopausal             Plan:      -  Chronic medical problems stable  -  Pt is not currently taking any meds, stopped on her own a few months ago  -  Check labs above, will call  -  RTO annually for now        Luiz Britt DO  Medicare Annual Wellness Visit  Name: Salena Vargas Date: 2020   MRN: [id number] Sex: Female   Age: 81 y.o. Ethnicity: Non-/Non    : 1935 Race: Holger Izaiah Yohan Mei is here for Medicare AWV and Hypertension    Screenings for behavioral, psychosocial and functional/safety risks, and cognitive dysfunction are all negative except as indicated below. These results, as well as other patient data from the [address] form, are documented in Flowsheets linked to this Encounter.     No Known Allergies      Prior to Visit Medications    Not on File         Past Medical History:   Diagnosis Date    Anxiety     CHF (congestive heart failure) ([facility] [address]     Thyroid condition        Past Surgical History:   Procedure Laterality Date    CHOLECYSTECTOMY      VASCULAR SURGERY           Family History   Problem Relation Age of Onset    Cancer Mother     Hearing Loss Father     Heart Attack Father     Cancer Sister     Cancer Maternal Grandfather        CareTeam (Including outside providers/suppliers regularly involved in providing care):   Patient Care Team:  Elieser Soto DO as PCP - General (Family Medicine)  Elieser Soto DO as PCP - [facility] Provider    Wt Readings from Last 3 Encounters:   11/09/20 184 lb 14.4 oz (83.9 kg)   01/07/20 180 lb 12.8 oz (82 kg)   04/30/18 199 lb (90.3 kg)     Vitals:    11/09/20 1120   BP: 136/80   Site: Left Upper Arm   Position: Sitting   Cuff Size: Medium Adult   Pulse: 60   Resp: 12   Temp: 97 °F (36.1 °C)   TempSrc: Temporal   Weight: 184 lb 14.4 oz (83.9 kg)   Height: 5' 5.75\" (1.67 m)     Body mass index is 30.07 kg/m². Based upon direct observation of the patient, evaluation of cognition reveals recent and remote memory intact. Patient's complete Health Risk Assessment and screening values have been reviewed and are found in Flowsheets. The following problems were reviewed today and where indicated follow up appointments were made and/or referrals ordered. Positive Risk Factor Screenings with Interventions:       General Health and ACP:  General  In general, how would you say your health is?: Very Good  In the past 7 days, have you experienced any of the following?  New or Increased Pain, New or Increased Fatigue, Loneliness, Social Isolation, Stress or Anger?: None of These  Do you get the social and emotional support that you need?: Yes  Do you have a Living Will?: Yes  Advance Directives     Power of  Living Will ACP-Advance Directive ACP-Power of     Not on File Not on File [address] Interventions:  · N/A    Health Habits/Nutrition:  Health Habits/Nutrition  Do you exercise for at least 20 minutes 2-3 times per week?: Yes  Have you lost any weight without trying in the past 3 months?: No  Do you eat fewer than 2 meals per day?: No  Have you seen a dentist within the past year?: Yes  Body mass index: (!) 30.07  Health Habits/Nutrition Interventions:  · Inadequate physical activity:  patient is not ready to increase his/her physical activity level at this time    Hearing/Vision:  No exam data present  Hearing/Vision  Do you or your family notice any trouble with your hearing?: No  Do you have difficulty driving, watching TV, or doing any of your daily activities because of your eyesight?: No  Have you had an eye exam within the past year?: (!) No(has appt tomorrow)  Hearing/Vision Interventions:  · Vision concerns:  patient encouraged to make appointment with his/her eye specialist    Personalized Preventive Plan   Current Health Maintenance Status  Immunization History   Administered Date(s) Administered    Influenza 10/03/2012    Influenza Vaccine, unspecified formulation 09/10/2015    Influenza Virus Vaccine 08/20/2014, 10/13/2016    Influenza, Quadv, adjuvanted, 65 yrs +, IM, PF (Fluad) 10/28/2020    Pneumococcal Conjugate 13-valent (Trent Na) 09/10/2013, 09/10/2015    Pneumococcal Polysaccharide ([id number]) 10/13/2016, 08/03/2017    Tdap (Boostrix, Adacel) 01/03/2016        Health Maintenance   Topic Date Due    Shingles Vaccine (1 of 2) 02/24/1986    Annual Wellness Visit (AWV)  01/07/2020    TSH testing  01/07/2021    DTaP/Tdap/Td vaccine (2 - Td) 01/03/2026    DEXA (modify frequency per FRAX score)  Completed    Flu vaccine  Completed    Pneumococcal 65+ years Vaccine  Completed    Hepatitis A vaccine  Aged Out    Hepatitis B vaccine  Aged Out    Hib vaccine  Aged Out    Meningococcal (ACWY) vaccine  Aged Out     Recommendations for [employer] Due: see orders and patient instructions/AVS.  . Recommended screening schedule for the next 5-10 years is provided to the patient in written form: see Patient Instructions/AVS.    Prema Garvey was seen today for medicare awv and hypertension.     Diagnoses and all orders for this visit:    Routine general medical examination at a health care facility    Essential hypertension    Hypothyroidism, unspecified type  -     TSH with Reflex; Future    IFG (impaired fasting glucose)    GIULIANA (acute kidney injury) ([facility] [address]  -     Basic Metabolic Panel;  Future    Anxiety    Post-menopausal

## 2020-11-10 LAB
ANION GAP SERPL CALCULATED.3IONS-SCNC: 10 MMOL/L (ref 5–15)
BUN BLDV-MCNC: 18 MG/DL (ref 5–27)
CALCIUM SERPL-MCNC: 9.7 MG/DL (ref 8.5–10.5)
CHLORIDE BLD-SCNC: 106 MMOL/L (ref 98–109)
CO2: 28 MMOL/L (ref 22–32)
CREAT SERPL-MCNC: 1.14 MG/DL (ref 0.4–1)
EGFR AFRICAN AMERICAN: 55 ML/MIN/1.73SQ.M
EGFR IF NONAFRICAN AMERICAN: 45 ML/MIN/1.73SQ.M
GLUCOSE: 97 MG/DL (ref 65–99)
POTASSIUM SERPL-SCNC: 4.2 MMOL/L (ref 3.5–5)
SODIUM BLD-SCNC: 144 MMOL/L (ref 134–146)
TSH SERPL DL<=0.05 MIU/L-ACNC: 3.29 UIU/ML (ref 0.49–4.67)

## 2021-03-22 ENCOUNTER — HOSPITAL ENCOUNTER (EMERGENCY)
Age: 86
Discharge: HOME | End: 2021-03-22
Attending: EMERGENCY MEDICINE
Payer: MEDICARE

## 2021-03-22 ENCOUNTER — APPOINTMENT (OUTPATIENT)
Dept: GENERAL RADIOLOGY | Age: 86
End: 2021-03-22
Payer: MEDICARE

## 2021-03-22 VITALS
HEIGHT: 66 IN | BODY MASS INDEX: 32.14 KG/M2 | RESPIRATION RATE: 16 BRPM | SYSTOLIC BLOOD PRESSURE: 154 MMHG | OXYGEN SATURATION: 97 % | WEIGHT: 200 LBS | DIASTOLIC BLOOD PRESSURE: 90 MMHG | TEMPERATURE: 97.9 F | HEART RATE: 99 BPM

## 2021-03-22 DIAGNOSIS — R55 SYNCOPE AND COLLAPSE: Primary | ICD-10-CM

## 2021-03-22 LAB
ALBUMIN SERPL-MCNC: 3.9 G/DL (ref 3.5–5.1)
ALP BLD-CCNC: 82 U/L (ref 38–126)
ALT SERPL-CCNC: 6 U/L (ref 11–66)
ANION GAP SERPL CALCULATED.3IONS-SCNC: 12 MEQ/L (ref 8–16)
AST SERPL-CCNC: 20 U/L (ref 5–40)
BACTERIA: ABNORMAL /HPF
BASOPHILS # BLD: 0.6 %
BASOPHILS ABSOLUTE: 0 THOU/MM3 (ref 0–0.1)
BILIRUB SERPL-MCNC: 0.4 MG/DL (ref 0.3–1.2)
BILIRUBIN DIRECT: < 0.2 MG/DL (ref 0–0.3)
BILIRUBIN URINE: NEGATIVE
BLOOD, URINE: NEGATIVE
BUN BLDV-MCNC: 17 MG/DL (ref 7–22)
CALCIUM SERPL-MCNC: 9.4 MG/DL (ref 8.5–10.5)
CASTS 2: ABNORMAL /LPF
CASTS UA: ABNORMAL /LPF
CHARACTER, URINE: CLEAR
CHLORIDE BLD-SCNC: 104 MEQ/L (ref 98–111)
CO2: 26 MEQ/L (ref 23–33)
COLOR: YELLOW
CREAT SERPL-MCNC: 1 MG/DL (ref 0.4–1.2)
CRYSTALS, UA: ABNORMAL
EOSINOPHIL # BLD: 1.3 %
EOSINOPHILS ABSOLUTE: 0.1 THOU/MM3 (ref 0–0.4)
EPITHELIAL CELLS, UA: ABNORMAL /HPF
ERYTHROCYTE [DISTWIDTH] IN BLOOD BY AUTOMATED COUNT: 13.5 % (ref 11.5–14.5)
ERYTHROCYTE [DISTWIDTH] IN BLOOD BY AUTOMATED COUNT: 48.7 FL (ref 35–45)
GFR SERPL CREATININE-BSD FRML MDRD: 53 ML/MIN/1.73M2
GLUCOSE BLD-MCNC: 120 MG/DL (ref 70–108)
GLUCOSE BLD-MCNC: 131 MG/DL (ref 70–108)
GLUCOSE URINE: NEGATIVE MG/DL
HCT VFR BLD CALC: 42.3 % (ref 37–47)
HEMOGLOBIN: 13.1 GM/DL (ref 12–16)
IMMATURE GRANS (ABS): 0.01 THOU/MM3 (ref 0–0.07)
IMMATURE GRANULOCYTES: 0.2 %
KETONES, URINE: NEGATIVE
LEUKOCYTE ESTERASE, URINE: ABNORMAL
LYMPHOCYTES # BLD: 21.6 %
LYMPHOCYTES ABSOLUTE: 1.3 THOU/MM3 (ref 1–4.8)
MCH RBC QN AUTO: 30.3 PG (ref 26–33)
MCHC RBC AUTO-ENTMCNC: 31 GM/DL (ref 32.2–35.5)
MCV RBC AUTO: 97.9 FL (ref 81–99)
MISCELLANEOUS 2: ABNORMAL
MONOCYTES # BLD: 5.8 %
MONOCYTES ABSOLUTE: 0.4 THOU/MM3 (ref 0.4–1.3)
NITRITE, URINE: NEGATIVE
NUCLEATED RED BLOOD CELLS: 0 /100 WBC
OSMOLALITY CALCULATION: 285.9 MOSMOL/KG (ref 275–300)
PH UA: 5.5 (ref 5–9)
PLATELET # BLD: 175 THOU/MM3 (ref 130–400)
PMV BLD AUTO: 12 FL (ref 9.4–12.4)
POTASSIUM SERPL-SCNC: 3.8 MEQ/L (ref 3.5–5.2)
PROTEIN UA: NEGATIVE
RBC # BLD: 4.32 MILL/MM3 (ref 4.2–5.4)
RBC URINE: ABNORMAL /HPF
RENAL EPITHELIAL, UA: ABNORMAL
SEG NEUTROPHILS: 70.5 %
SEGMENTED NEUTROPHILS ABSOLUTE COUNT: 4.4 THOU/MM3 (ref 1.8–7.7)
SODIUM BLD-SCNC: 142 MEQ/L (ref 135–145)
SPECIFIC GRAVITY, URINE: 1.01 (ref 1–1.03)
T4 FREE: 0.98 NG/DL (ref 0.93–1.76)
TOTAL PROTEIN: 7.3 G/DL (ref 6.1–8)
TROPONIN T: < 0.01 NG/ML
TSH SERPL DL<=0.05 MIU/L-ACNC: 3.6 UIU/ML (ref 0.4–4.2)
UROBILINOGEN, URINE: 0.2 EU/DL (ref 0–1)
WBC # BLD: 6.2 THOU/MM3 (ref 4.8–10.8)
WBC UA: ABNORMAL /HPF
YEAST: ABNORMAL

## 2021-03-22 PROCEDURE — 82948 REAGENT STRIP/BLOOD GLUCOSE: CPT

## 2021-03-22 PROCEDURE — 80053 COMPREHEN METABOLIC PANEL: CPT

## 2021-03-22 PROCEDURE — 84439 ASSAY OF FREE THYROXINE: CPT

## 2021-03-22 PROCEDURE — 99284 EMERGENCY DEPT VISIT MOD MDM: CPT

## 2021-03-22 PROCEDURE — 81001 URINALYSIS AUTO W/SCOPE: CPT

## 2021-03-22 PROCEDURE — 71045 X-RAY EXAM CHEST 1 VIEW: CPT

## 2021-03-22 PROCEDURE — 82248 BILIRUBIN DIRECT: CPT

## 2021-03-22 PROCEDURE — 84484 ASSAY OF TROPONIN QUANT: CPT

## 2021-03-22 PROCEDURE — 85025 COMPLETE CBC W/AUTO DIFF WBC: CPT

## 2021-03-22 PROCEDURE — 84443 ASSAY THYROID STIM HORMONE: CPT

## 2021-03-22 PROCEDURE — 36415 COLL VENOUS BLD VENIPUNCTURE: CPT

## 2021-03-22 PROCEDURE — 93005 ELECTROCARDIOGRAM TRACING: CPT | Performed by: EMERGENCY MEDICINE

## 2021-03-22 ASSESSMENT — ENCOUNTER SYMPTOMS
COUGH: 0
ABDOMINAL PAIN: 0
DIARRHEA: 0
NAUSEA: 0
CONSTIPATION: 0
EYE DISCHARGE: 0
SHORTNESS OF BREATH: 0
VOMITING: 0

## 2021-03-22 NOTE — ED NOTES
Patient presents to ED via EMS for dizziness and LOC. EMS states patient was going to her  grave site and passed out before getting out of the car. Patient states she does not remember what happened. Denies any pain. Shows no signs of distress. Skin warm and dry. Respirations even and unlabored.       Yesenia Fraga RN  03/22/21 [id number]

## 2021-03-22 NOTE — ED PROVIDER NOTES
[place] ENCOUNTER          Pt Name: Sam Casarez  MRN: [id number]  [place] 1935  Date of evaluation: 3/22/2021  Treating Resident Physician: Jules Mullen DPM  Supervising Physician: Jesenia Gurrola MD    [address] [place] [address]       Chief Complaint   Patient presents with    Dizziness    Loss of Consciousness     History obtained from the patient, son, and fried      HISTORY OF PRESENT ILLNESS    HPI  Sam Casarez is a 81 y.o. female who presents to the emergency department for evaluation of dizziness and confusion. Patient unable to give complete story. Patient relates she went to get fast food, then drove to the cemetery to spend time by her 's grave today. Some time after arriving to the [facility] site, patient called her son stating that she was dizzy and did not feet like she was able to drive back home safely. Patient's son then contacted patient's friend who was able to take patient to the ED. In the ED, patient is unable to discuss what happened at the [place]. Patient states she does not understand why she is here in the ED. She denies any current dizziness or symptoms similar to what she had at the [place]. When patient's son arrived to the ED, he claims that this is baseline level of mental status for his mother. The patient has no other acute complaints at this time. REVIEW OF SYSTEMS   Review of Systems   Constitutional: Negative for chills and fever. HENT: Negative for drooling and sneezing. Eyes: Negative for discharge and visual disturbance. Respiratory: Negative for cough and shortness of breath. Cardiovascular: Negative for chest pain and palpitations. Gastrointestinal: Negative for abdominal pain, constipation, diarrhea, nausea and vomiting. Genitourinary: Negative for dysuria and urgency. Musculoskeletal: Negative for arthralgias. Skin: Negative for rash and wound.    Neurological: Negative for seizures, facial asymmetry and speech difficulty. Psychiatric/Behavioral: Positive for agitation and confusion. The patient is nervous/anxious. PAST MEDICAL AND SURGICAL HISTORY     Past Medical History:   Diagnosis Date    Anxiety     CHF (congestive heart failure) ([address]     Thyroid condition      Past Surgical History:   Procedure Laterality Date    CHOLECYSTECTOMY      VASCULAR SURGERY           MEDICATIONS   No current facility-administered medications for this encounter. No current outpatient medications on file. SOCIAL HISTORY     Social History     Social History Narrative    Not on file     Social History     Tobacco Use    Smoking status: Never Smoker    Smokeless tobacco: Never Used   Substance Use Topics    Alcohol use: No    Drug use: No         ALLERGIES   No Known Allergies      FAMILY HISTORY     Family History   Problem Relation Age of Onset    Cancer Mother     Hearing Loss Father     Heart Attack Father     Cancer Sister     Cancer Maternal Grandfather          PREVIOUS RECORDS   Previous records reviewed: previous visits non-contributory. PHYSICAL EXAM     ED Triage Vitals [03/22/21 1119]   BP Temp Temp Source Pulse Resp SpO2 Height Weight   (!) 153/92 97.9 °F (36.6 °C) Oral 98 16 96 % 5' 6\" (1.676 m) 200 lb (90.7 kg)     Initial vital signs and nursing assessment reviewed and normal. Body mass index is 32.28 kg/m². Pulsoximetry is normal per my interpretation. Additional Vital Signs:  Vitals:    03/22/21 1225   BP: (!) 153/96   Pulse: 94   Resp: 16   Temp:    SpO2: 98%       Physical Exam  Constitutional:       Appearance: Normal appearance. She is obese. HENT:      Head: Normocephalic and atraumatic. Eyes:      Extraocular Movements: Extraocular movements intact. Conjunctiva/sclera: Conjunctivae normal.      Pupils: Pupils are equal, round, and reactive to light.    Cardiovascular:      Rate and Rhythm: Normal rate and regular rhythm. Pulses: Normal pulses. Heart sounds: Normal heart sounds. Pulmonary:      Effort: Pulmonary effort is normal.      Breath sounds: Normal breath sounds. No stridor. No wheezing or rales. Abdominal:      General: Abdomen is flat. Bowel sounds are normal.      Palpations: Abdomen is soft. Skin:     General: Skin is warm and dry. Neurological:      General: No focal deficit present. Mental Status: She is alert and oriented to person, place, and time. Mental status is at baseline. Cranial Nerves: No cranial nerve deficit. Comments: Patient is alert and oriented x 3   Psychiatric:      Comments: Patient is agitated and expresses confusion about why she is in the Emergency Department             MEDICAL DECISION MAKING   Initial Assessment:   1. Syncope. 2. History of CHF  Plan:    Patient seen and evaluated. Initial lab work included POCT glucose, CBC, BMP, hepatic function panel, troponin, TSH, T4, and urine analysis. Results were not significant for any acute pathology. EKG and CXR ordered, both demonstrating no acute pathology. Discussed case with patient's son. There was concern that patient was in an altered state of mind, and would not be safe to return home by herself. Patient's son stated that this is typical for his mother. Discussed with patient and her son the importance of routine follow-up with her PCP. Additionally recommended that patient follow-up with physical therapy for the dizziness that she experienced earlier today.        ED RESULTS   Laboratory results:  Labs Reviewed   CBC WITH AUTO DIFFERENTIAL - Abnormal; Notable for the following components:       Result Value    MCHC 31.0 (*)     RDW-SD 48.7 (*)     All other components within normal limits   BASIC METABOLIC PANEL - Abnormal; Notable for the following components:    Glucose 120 (*)     All other components within normal limits   HEPATIC FUNCTION PANEL - Abnormal; Notable for the following components: ALT 6 (*)     All other components within normal limits   GLOMERULAR FILTRATION RATE, ESTIMATED - Abnormal; Notable for the following components:    Est, Glom Filt Rate 53 (*)     All other components within normal limits   URINE WITH REFLEXED MICRO - Abnormal; Notable for the following components:    Leukocyte Esterase, Urine TRACE (*)     All other components within normal limits   POCT GLUCOSE - Abnormal; Notable for the following components:    POC Glucose 131 (*)     All other components within normal limits   TROPONIN   OSMOLALITY   TSH WITHOUT REFLEX   T4, FREE   ANION GAP       Radiologic studies results:  XR CHEST PORTABLE   Final Result   No interval change since previous study dated 25 April 2018, no acute cardiopulmonary disease. .               **This report has been created using voice recognition software. It may contain minor errors which are inherent in voice recognition technology. **      Final report electronically signed by DR Wendy Li on 3/22/2021 11:52 AM          ED Medications administered this visit: Medications - No data to display      ED COURSE        Strict return precautions and follow up instructions were discussed with the patient prior to discharge, with which the patient agrees. MEDICATION CHANGES     There are no discharge medications for this patient. FINAL DISPOSITION     Final diagnoses:   Syncope and collapse     Condition: condition: fair  Dispo: Discharge to home      This transcription was electronically signed. Parts of this transcriptions may have been dictated by use of voice recognition software and electronically transcribed, and parts may have been transcribed with the assistance of an ED scribe. The transcription may contain errors not detected in proofreading. Please refer to my supervising physician's documentation if my documentation differs.     Electronically Signed: Roshan Eaton, 03/22/21, 12:28 PM          Roshan Eaton DPM  Resident  03/22/21 2547 [facility]

## 2021-03-22 NOTE — ED NOTES
Patient resting quietly in cot showing no signs of distress at this time. Friend remains at bedside. Updated on POC. Call light within reach.       Estephania Cabrera RN  03/22/21 1390

## 2021-03-22 NOTE — ED NOTES
Bed: 030A  Expected date:   Expected time:   Means of arrival:   Comments:  [address] [place], RN  03/22/21 1110

## 2021-03-22 NOTE — ED PROVIDER NOTES
[facility]  EMERGENCY MEDICINE ATTENDING ATTESTATION      Evaluation of Sanket Grade. Case discussed and care plan developed with resident physician. I agree with the resident physician documentation and plan as documented by him, except if my documentation differs. Patient seen, interviewed and examined by me. I reviewed the medical, surgical, family and social history, medications and allergies. I have reviewed the nursing documentation. I have reviewed the patient's vital signs and are abnormal from hypertension per my interpretation. Body mass index is 32.28 kg/m². Pulsoxymetry is normal per my interpretation. Brief H&P   Patient c/o feeling lightheaded and possibly having a syncopal episode while visiting her 's grave. Patient states he goes every day after breakfast but today she had to sit down because she was not feeling well. She is unable to describe exactly was not feeling well but standing up on walking, even to the bathroom here elicits her symptoms. Physical exam is notable for well appearing, focal exam.  Seems slightly confused although she is able to answer orientation questions appropriately. Medical Decision Making   MDM:   1. Syncope, history of CHF  Plan:    I V line, labs   EKG   Observation. Please see the resident physician completed note for final disposition except as documented on this attestation. I have reviewed and interpreted all available lab, radiology and ekg results available at the moment. Diagnosis, treatment and disposition plans were discussed and agreed upon by patient. This transcription was electronically signed. It was dictated by use of voice recognition software and electronically transcribed. The transcription may contain errors not detected in proofreading.      I performed direct supervision and was present for the critical portion following procedures: None  Critical care time on this

## 2021-03-23 LAB
EKG ATRIAL RATE: 97 BPM
EKG ATRIAL RATE: 97 BPM
EKG P AXIS: 33 DEGREES
EKG P AXIS: 33 DEGREES
EKG P-R INTERVAL: 174 MS
EKG P-R INTERVAL: 174 MS
EKG Q-T INTERVAL: 374 MS
EKG Q-T INTERVAL: 374 MS
EKG QRS DURATION: 92 MS
EKG QRS DURATION: 92 MS
EKG QTC CALCULATION (BAZETT): 474 MS
EKG QTC CALCULATION (BAZETT): 474 MS
EKG R AXIS: 2 DEGREES
EKG R AXIS: 2 DEGREES
EKG T AXIS: 51 DEGREES
EKG T AXIS: 51 DEGREES
EKG VENTRICULAR RATE: 97 BPM
EKG VENTRICULAR RATE: 97 BPM

## 2021-03-24 ENCOUNTER — OFFICE VISIT (OUTPATIENT)
Dept: FAMILY MEDICINE CLINIC | Age: 86
End: 2021-03-24
Payer: MEDICARE

## 2021-03-24 VITALS
BODY MASS INDEX: 29.57 KG/M2 | OXYGEN SATURATION: 96 % | WEIGHT: 183.2 LBS | SYSTOLIC BLOOD PRESSURE: 130 MMHG | RESPIRATION RATE: 18 BRPM | TEMPERATURE: 97.1 F | DIASTOLIC BLOOD PRESSURE: 86 MMHG | HEART RATE: 88 BPM

## 2021-03-24 DIAGNOSIS — R41.82 ALTERED MENTAL STATUS, UNSPECIFIED ALTERED MENTAL STATUS TYPE: Primary | ICD-10-CM

## 2021-03-24 DIAGNOSIS — Z91.81 HISTORY OF RECENT FALL: ICD-10-CM

## 2021-03-24 DIAGNOSIS — R26.81 UNSTABLE GAIT: ICD-10-CM

## 2021-03-24 DIAGNOSIS — F41.9 ANXIETY: ICD-10-CM

## 2021-03-24 PROCEDURE — 4040F PNEUMOC VAC/ADMIN/RCVD: CPT | Performed by: FAMILY MEDICINE

## 2021-03-24 PROCEDURE — G8484 FLU IMMUNIZE NO ADMIN: HCPCS | Performed by: FAMILY MEDICINE

## 2021-03-24 PROCEDURE — 1123F ACP DISCUSS/DSCN MKR DOCD: CPT | Performed by: FAMILY MEDICINE

## 2021-03-24 PROCEDURE — 1036F TOBACCO NON-USER: CPT | Performed by: FAMILY MEDICINE

## 2021-03-24 PROCEDURE — 1090F PRES/ABSN URINE INCON ASSESS: CPT | Performed by: FAMILY MEDICINE

## 2021-03-24 PROCEDURE — G8400 PT W/DXA NO RESULTS DOC: HCPCS | Performed by: FAMILY MEDICINE

## 2021-03-24 PROCEDURE — G8427 DOCREV CUR MEDS BY ELIG CLIN: HCPCS | Performed by: FAMILY MEDICINE

## 2021-03-24 PROCEDURE — 99214 OFFICE O/P EST MOD 30 MIN: CPT | Performed by: FAMILY MEDICINE

## 2021-03-24 PROCEDURE — G8417 CALC BMI ABV UP PARAM F/U: HCPCS | Performed by: FAMILY MEDICINE

## 2021-03-24 RX ORDER — ASPIRIN 81 MG/1
81 TABLET ORAL DAILY
COMMUNITY

## 2021-03-24 RX ORDER — ESCITALOPRAM OXALATE 10 MG/1
10 TABLET ORAL DAILY
Qty: 30 TABLET | Refills: 1 | Status: SHIPPED | OUTPATIENT
Start: 2021-03-24

## 2021-03-24 ASSESSMENT — ENCOUNTER SYMPTOMS
RESPIRATORY NEGATIVE: 1
GASTROINTESTINAL NEGATIVE: 1

## 2021-03-24 NOTE — PROGRESS NOTES
Marisela Mendes (:  1935) is a 81 y.o. female,Established patient, here for evaluation of the following chief complaint(s):  Follow-Up from [facility] [place]. Lorena's 3/22/21 S/P fall ) and Dizziness (with weakness hard time getting up this am )        SUBJECTIVE/OBJECTIVE:  HPI:    Chief Complaint   Patient presents with   [address] from [facility]. Lorena's 3/22/21 S/P fall     Dizziness     with weakness hard time getting up this am      ED follow up for dizziness.       CHIEF COMPLAINT            Chief Complaint   Patient presents with    Dizziness    Loss of Consciousness      History obtained from the patient, son, and fried        HISTORY OF PRESENT ILLNESS    HPI  Marisela Mendes is a 81 y.o. female who presents to the emergency department for evaluation of dizziness and confusion. Patient unable to give complete story. Patient relates she went to get fast food, then drove to the cemetery to spend time by her 's grave today. Some time after arriving to the [facility] site, patient called her son stating that she was dizzy and did not feet like she was able to drive back home safely. Patient's son then contacted patient's friend who was able to take patient to the ED. In the ED, patient is unable to discuss what happened at the [place]. Patient states she does not understand why she is here in the ED. She denies any current dizziness or symptoms similar to what she had at the [place]. When patient's son arrived to the ED, he claims that this is baseline level of mental status for his mother. The patient has no other acute complaints at this time. Pt continues to feel very dizzy, having a hard time walking. The dizziness started while she was at the [address] [place] [address]. on the . Per pt, she is \"just not quite with it\". She is very anxious that she cannot stay home alone since her fall. Her balance is poor, she walks with the aid of a cane. BP looks very good.   BP Readings from Last 3 Encounters:   03/24/21 130/86   03/22/21 (!) 154/90   11/09/20 136/80         Review of Systems   Constitutional: Positive for fatigue. HENT: Negative. Respiratory: Negative. Cardiovascular: Negative. Gastrointestinal: Negative. Musculoskeletal: Positive for gait problem. Neurological: Positive for weakness. Negative for dizziness, speech difficulty, light-headedness and headaches. All other systems reviewed and are negative. Physical Exam  Vitals signs and nursing note reviewed. Constitutional:       General: She is not in acute distress. Appearance: Normal appearance. She is well-developed. HENT:      Head: Normocephalic and atraumatic. Right Ear: Tympanic membrane normal.      Left Ear: Tympanic membrane normal.   Eyes:      Extraocular Movements:      Right eye: Normal extraocular motion and no nystagmus. Left eye: Normal extraocular motion and no nystagmus. Conjunctiva/sclera: Conjunctivae normal.   Neck:      Musculoskeletal: Neck supple. Cardiovascular:      Rate and Rhythm: Normal rate and regular rhythm. Heart sounds: Normal heart sounds. No murmur. Pulmonary:      Effort: Pulmonary effort is normal.      Breath sounds: Normal breath sounds. No wheezing, rhonchi or rales. Abdominal:      General: There is no distension. Skin:     General: Skin is warm and dry. Findings: No rash (on exposed surfaces). Neurological:      General: No focal deficit present. Mental Status: She is alert. Cranial Nerves: Cranial nerves are intact. Sensory: Sensation is intact. Motor: Weakness and atrophy present. Gait: Gait abnormal (slow, unsteady gait). Psychiatric:         Attention and Perception: Attention normal.         Mood and Affect: Mood normal.         Speech: Speech normal.         Behavior: Behavior normal. Behavior is cooperative. Thought Content:  Thought content normal.         Judgment: Judgment normal.      ASSESSMENT/PLAN:  1. Altered mental status, unspecified altered mental status type  2. History of recent fall  3. Unstable gait  4. Anxiety  -     escitalopram (LEXAPRO) 10 MG tablet; Take 1 tablet by mouth daily, Disp-30 tablet, [id number]    -  Uncertain etiology of her symptoms at this time  -  Per ED note and son, pt with periodic dizziness and unsteady gait  -  Per pt, she just \"feels off\" but denies dizziness or lightheadedness  -  Gait is unsteady, walks with aid of cane but willing to use wheeled walker for which they have  -  Labs, CXR, EKG in ED ok  -  Discussed further work up:  CT, holter, echo, carotid US, etc.  Pt declines at this time  -  Start Lexapro for her anxiety    Return in about 4 weeks (around 4/21/2021) for anxiety. An electronic signature was used to authenticate this note.     --Acacia Dunham, DO

## 2021-03-24 NOTE — PROGRESS NOTES
Visit Information    Have you changed or started any medications since your last visit including any over-the-counter medicines, vitamins, or herbal medicines? no   Are you having any side effects from any of your medications? -  no  Have you stopped taking any of your medications? Is so, why? -  no    Have you seen any other physician or provider since your last visit? Yes - Records Obtained  Have you had any other diagnostic tests since your last visit? Yes - Records Obtained  Have you been seen in the emergency room and/or had an admission to a hospital since we last saw you? Yes - Records Obtained  Have you had your routine dental cleaning in the past 6 months? na    Have you activated your [employer] account? If not, what are your barriers?  Yes     Patient Care Team:  Trenton Graves DO as PCP - General (Family Medicine)  Trenton Graves DO as PCP - [facility]    Medical History Review  Past Medical, Family, and Social History reviewed and does not contribute to the patient presenting condition    Health Maintenance   Topic Date Due    Shingles Vaccine (1 of 2) Never done    COVID-19 Vaccine (2 - Moderna 2-dose series) 03/25/2021    Annual Wellness Visit (AWV)  11/10/2021    TSH testing  03/22/2022    DTaP/Tdap/Td vaccine (2 - Td) 01/03/2026    DEXA (modify frequency per FRAX score)  Completed    Flu vaccine  Completed    Pneumococcal 65+ years Vaccine  Completed    Hepatitis A vaccine  Aged Out    Hepatitis B vaccine  Aged Out    Hib vaccine  Aged Out    Meningococcal (ACWY) vaccine  Aged Out

## 2021-05-03 ENCOUNTER — TELEPHONE (OUTPATIENT)
Dept: FAMILY MEDICINE CLINIC | Age: 86
End: 2021-05-03

## 2021-05-03 NOTE — TELEPHONE ENCOUNTER
----- Message from Akash Joy sent at 4/30/2021  5:22 PM EDT -----  Subject: Message to Provider    QUESTIONS  Information for Provider? Patient called in because she stated she   realized she missed her 4 week f/u 47/26/21 she stated she feels fine ,   does she need to make another appt. ... ---------------------------------------------------------------------------  --------------  [employer] ANNALISA  What is the best way for the office to contact you? OK to leave message on   voicemail  Preferred Call Back Phone Number? [phone]  ---------------------------------------------------------------------------  --------------  SCRIPT ANSWERS  Relationship to Patient?  Self

## 2025-05-07 ENCOUNTER — TELEPHONE (OUTPATIENT)
Dept: FAMILY MEDICINE CLINIC | Age: 89
End: 2025-05-07

## 2025-05-07 NOTE — TELEPHONE ENCOUNTER
Received a call from Kim at [facility]. She stated that while in rehab, Dr. Agarwal had written a 30 day prescription for Doxepin 10 mg in the evenings. The prescription has ran out, so they were reaching out to her PCP. Kim updated that patient has not been seen since 3/24/21, in which case she would be a New patient to us. However, it looks like she had been following with Sanjana Mccormack CNP with [facility]. Kim is going to reach out to Sanjana Mccormack's office.